# Patient Record
Sex: MALE
[De-identification: names, ages, dates, MRNs, and addresses within clinical notes are randomized per-mention and may not be internally consistent; named-entity substitution may affect disease eponyms.]

---

## 2018-08-17 PROBLEM — Z00.00 ENCOUNTER FOR PREVENTIVE HEALTH EXAMINATION: Status: ACTIVE | Noted: 2018-08-17

## 2020-06-17 ENCOUNTER — TRANSCRIPTION ENCOUNTER (OUTPATIENT)
Age: 50
End: 2020-06-17

## 2023-06-27 ENCOUNTER — APPOINTMENT (OUTPATIENT)
Dept: COLORECTAL SURGERY | Facility: CLINIC | Age: 53
End: 2023-06-27
Payer: COMMERCIAL

## 2023-06-27 VITALS
WEIGHT: 165 LBS | TEMPERATURE: 98.7 F | BODY MASS INDEX: 23.62 KG/M2 | DIASTOLIC BLOOD PRESSURE: 77 MMHG | HEIGHT: 70 IN | SYSTOLIC BLOOD PRESSURE: 124 MMHG | RESPIRATION RATE: 14 BRPM | OXYGEN SATURATION: 100 % | HEART RATE: 59 BPM

## 2023-06-27 DIAGNOSIS — K64.4 RESIDUAL HEMORRHOIDAL SKIN TAGS: ICD-10-CM

## 2023-06-27 PROCEDURE — 99203 OFFICE O/P NEW LOW 30 MIN: CPT | Mod: 25

## 2023-06-27 PROCEDURE — 46600 DIAGNOSTIC ANOSCOPY SPX: CPT

## 2023-06-27 NOTE — PHYSICAL EXAM
[Respiratory Effort] : normal respiratory effort [Normal Rate and Rhythm] : normal rate and rhythm [No Edema] : No edema [No Rash or Lesion] : No rash or lesion [Alert] : alert [Oriented to Person] : oriented to person [Oriented to Place] : oriented to place [Oriented to Time] : oriented to time [Calm] : calm [JVD] : no jugular venous distention  [de-identified] : Soft, nondistended [de-identified] : External exam - L anterior benign skin tag, large; posterior midline small skin tag.  No fissure, fistula, excoriation, thrombosis.  RODRICK without masses or tenderness.  Anoscopy with nonbleeding circumferential grade I hemorrhoids without inflammation. [de-identified] : No distress [de-identified] : Normocephalic [de-identified] : Moves all extremities

## 2023-06-27 NOTE — PROCEDURE
[FreeTextEntry1] : After informed consent was obtained, a lubricated lighted anoscope was inserted into the anal canal to evaluate for hemorrhoids. The canal was inspected circumferentially up to the level above the dentate line.  The scope was withdrawn. The patient tolerated the procedure well.

## 2023-06-27 NOTE — HISTORY OF PRESENT ILLNESS
[FreeTextEntry1] : 52yoM who recently underwent colonoscopy and was advised by his GI to be seen for enlarged hemorrhoids.  He denies any bleeding, pain, swelling, external lump, fever, chills, drainage.  Bowel habit is regular, no issues with constipation/straining, BMs soft and easy to pass.  Per patient colonoscopy only showed enlarged hemorrhoids, no polyps - report is not available to me at this time.  He is otherwise healthy and family, social history are all negative.